# Patient Record
Sex: FEMALE | Race: BLACK OR AFRICAN AMERICAN
[De-identification: names, ages, dates, MRNs, and addresses within clinical notes are randomized per-mention and may not be internally consistent; named-entity substitution may affect disease eponyms.]

---

## 2018-02-13 ENCOUNTER — HOSPITAL ENCOUNTER (OUTPATIENT)
Dept: HOSPITAL 61 - RAD | Age: 58
Discharge: HOME | End: 2018-02-13
Attending: FAMILY MEDICINE
Payer: COMMERCIAL

## 2018-02-13 DIAGNOSIS — M51.36: Primary | ICD-10-CM

## 2018-02-13 DIAGNOSIS — M19.011: ICD-10-CM

## 2018-02-13 PROCEDURE — 73030 X-RAY EXAM OF SHOULDER: CPT

## 2018-02-13 PROCEDURE — 72100 X-RAY EXAM L-S SPINE 2/3 VWS: CPT

## 2018-11-26 ENCOUNTER — HOSPITAL ENCOUNTER (EMERGENCY)
Dept: HOSPITAL 61 - ER | Age: 58
Discharge: HOME | End: 2018-11-26
Payer: COMMERCIAL

## 2018-11-26 VITALS — HEIGHT: 65 IN | WEIGHT: 285 LBS | BODY MASS INDEX: 47.48 KG/M2

## 2018-11-26 VITALS — SYSTOLIC BLOOD PRESSURE: 117 MMHG | DIASTOLIC BLOOD PRESSURE: 66 MMHG

## 2018-11-26 DIAGNOSIS — I10: ICD-10-CM

## 2018-11-26 DIAGNOSIS — M19.90: ICD-10-CM

## 2018-11-26 DIAGNOSIS — Z90.49: ICD-10-CM

## 2018-11-26 DIAGNOSIS — Z88.0: ICD-10-CM

## 2018-11-26 DIAGNOSIS — G89.29: ICD-10-CM

## 2018-11-26 DIAGNOSIS — C34.90: Primary | ICD-10-CM

## 2018-11-26 DIAGNOSIS — Z98.51: ICD-10-CM

## 2018-11-26 DIAGNOSIS — Z88.5: ICD-10-CM

## 2018-11-26 DIAGNOSIS — E11.9: ICD-10-CM

## 2018-11-26 DIAGNOSIS — R18.8: ICD-10-CM

## 2018-11-26 PROCEDURE — 71045 X-RAY EXAM CHEST 1 VIEW: CPT

## 2018-11-26 PROCEDURE — 99283 EMERGENCY DEPT VISIT LOW MDM: CPT

## 2018-11-26 NOTE — PHYS DOC
Past Medical History


Past Medical History:  Arthritis, Cancer, Diabetes-Type II, Hypertension, Other


Additional Past Medical Histor:  STAGE 4 LUNG CA


Past Surgical History:  Cholecystectomy, Tubal ligation, Other


Alcohol Use:  None


Drug Use:  None





Adult General


Chief Complaint


Chief Complaint:  ABDOMINAL PAIN





HPI


HPI





Patient is a 58  year old female who presents requesting help with drainage of 

ascites.  The patient is known to have lung cancer. She subsequently has 

continuous accumulation of ascites in her abdomen. She has a drain in place 

over the right portion of her upper abdomen. The patient states she has to 

drain the fluid many times weekly. Ordinarily, she is able to drain off 

anywhere from 250-400 mL of fluid. She does this at home and her family helps 

her. Patient states over the last couple of days, they have been unable to 

drain a normal amount of fluid from her abdomen. She states only 40 mL over the 

last 2 days. It is uncertain if her tube is clogged or if this is an equipment 

problem or if it is an education problem.  Patient states when she needs to 

have fluid drained she does have dyspnea worse than baseline. She uses oxygen 

at home and has dyspnea is because of her lung cancer. Today, she does not 

complain of any new or acute problem. She simply states she is having normal 

symptoms when she has fluid acute bleeding in her abdomen. No chest pain. No 

dizziness or lightheadedness. The patient does have chronic pain for which she 

uses about 4 Percocet daily.





Review of Systems


Review of Systems





Constitutional: Denies fever


Eyes: Denies change in visual acuity


HENT: Denies nasal congestion


Respiratory: Denies cough or dyspnea worse that baseline


Cardiovascular: No additional information not addressed in HPI


GI: Denies n/v


: Denies dysuria or hematuria 


Musculoskeletal: Denies back pain 


Integument: Denies rash or skin lesion


Neurologic: Denies neuro complaints


Endocrine: Denies polyuria or polydipsia 





All other systems were reviewed and found to be within normal limits, except as 

documented in this note.





Current Medications


Current Medications





Current Medications








 Medications


  (Trade)  Dose


 Ordered  Sig/[place]  Start Time


 Stop Time Status Last Admin


Dose Admin


 


 Oxycodone/


 Acetaminophen


  (Percocet 5/325)  2 tab  1X  ONCE  11/26/18 05:00


 11/26/18 05:01  11/26/18 04:48


2 TAB











Allergies


Allergies





Allergies








Coded Allergies Type Severity Reaction Last Updated Verified


 


  Penicillins Allergy Intermediate  11/26/18 Yes


 


  codeine Allergy Intermediate  11/26/18 Yes











Physical Exam


Physical Exam





Constitutional: Well developed, well nourished, no acute distress, non-toxic 

appearance


HENT: Normocephalic, atraumatic, bilateral external ears normal, oropharynx 

moist


Eyes: PERRLA, EOMI, conjunctiva normal 


Neck: Normal range of motion, no tenderness 


Cardiovascular:Heart rate regular rhythm, no murmur 


Lungs & Thorax:  Bilateral breath sounds clear to auscultation 


Abdomen: Bowel sounds normal, soft, no, no obvious edema of abdominal wall 


Skin: Warm, dry, no erythema, no rash  


Extremities: No tenderness, no cyanosis, no clubbing, ROM intact, no edema 


Neurologic: Alert and oriented X 3


Psychologic: Affect normal





Current Patient Data


Vital Signs





 Vital Signs








  Date Time  Temp Pulse Resp B/P (MAP) Pulse Ox O2 Delivery O2 Flow Rate FiO2


 


11/26/18 04:48   20  96 Nasal Cannula  


 


11/26/18 03:00 98.4 105  134/73 (93)   2.0 





 98.4       











EKG


EKG


[]





Radiology/Procedures


Radiology/Procedures


No previous studies are available for comparison. The cardiac silhouette 


is mild to moderately enlarged. There is a moderate sized right pleural 


effusion. The thoracic aorta is mildly tortuous. Right lower lobe 


atelectasis and/or infiltrate is seen. The left lung is clear. 


Degenerative changes are seen involving the thoracic spine and both 


shoulders.


 


 


IMPRESSION: Mild to moderate cardiomegaly. Moderate sized right pleural 


effusion. Right lower lobe atelectasis and/or infiltrate is seen.





Course & Med Decision Making


Course & Med Decision Making


Pertinent Labs and Imaging studies reviewed. (See chart for details)





Patient was evaluated in the ER for a technical issue regarding drainage from 

of fluid. She is normally followed at a different hospital so there are few 

records available for review. In the ER today, we were able to drain over 250 

Mills of fluid from her drain. Following this, the patient felt much improved. 

She feels at baseline and prefers discharge home over admission. She did not 

have chest pain or any other acute complaints. She simply had difficulty with 

the normal procedure she would be doing at home. Patient did have a pleural 

effusion on the right on her x-ray but patient states she always has this 

finding and states it is sometimes drained as well. Patient is discharged home 

today. She is calling her son for a ride home. She is advised to follow-up with 

her primary care team at [facility] or return to this ER or 

in the ER if she develops any new or worsening symptoms.





Dragon Disclaimer


Dragon Disclaimer


This electronic medical record was generated, in whole or in part, using a 

voice recognition dictation system.





Departure


Departure


Impression:  


 Primary Impression:  


 Ascites


 Additional Impression:  


 Lung cancer


Disposition:  01 HOME, SELF-CARE


Condition:  GOOD


Patient Instructions:  Ascites, Ascites Drainage Catheter Home Guide


Scripts


Oxycodone/Apap 5-325 (PERCOCET 5-325 MG TABLET) 1 Each Tablet


1-2 EACH PO PRN TID PRN for SEVERE PAIN, #25 TAB


   pain


   Prov: PHILIP OAKES DO         11/26/18





Problem Qualifiers











PHILIP OAKES DO Nov 26, 2018 04:49

## 2018-11-26 NOTE — RAD
AP portable chest  radiograph 11/26/2018

 

Clinical History: Shortness of breath. Lung cancer.

 

Two AP erect portable digital radiographs of the chest were obtained.

 

No previous studies are available for comparison. The cardiac silhouette 

is mild to moderately enlarged. There is a moderate sized right pleural 

effusion. The thoracic aorta is mildly tortuous. Right lower lobe 

atelectasis and/or infiltrate is seen. The left lung is clear. 

Degenerative changes are seen involving the thoracic spine and both 

shoulders.

 

 

IMPRESSION: Mild to moderate cardiomegaly. Moderate sized right pleural 

effusion. Right lower lobe atelectasis and/or infiltrate is seen.

 

Electronically signed by: Jamie Carter MD (11/26/2018 4:21 AM) [facility]